# Patient Record
Sex: MALE | Race: WHITE | NOT HISPANIC OR LATINO | URBAN - METROPOLITAN AREA
[De-identification: names, ages, dates, MRNs, and addresses within clinical notes are randomized per-mention and may not be internally consistent; named-entity substitution may affect disease eponyms.]

---

## 2018-09-11 ENCOUNTER — EMERGENCY (EMERGENCY)
Facility: HOSPITAL | Age: 53
LOS: 1 days | Discharge: HOME | End: 2018-09-11
Attending: EMERGENCY MEDICINE
Payer: COMMERCIAL

## 2018-09-11 VITALS
RESPIRATION RATE: 17 BRPM | SYSTOLIC BLOOD PRESSURE: 160 MMHG | OXYGEN SATURATION: 98 % | TEMPERATURE: 99 F | DIASTOLIC BLOOD PRESSURE: 98 MMHG | HEART RATE: 79 BPM

## 2018-09-11 VITALS — HEIGHT: 73 IN | WEIGHT: 266.1 LBS

## 2018-09-11 PROCEDURE — 99283 EMERGENCY DEPT VISIT LOW MDM: CPT

## 2018-09-11 RX ORDER — METHOCARBAMOL 500 MG/1
2 TABLET, FILM COATED ORAL
Qty: 30 | Refills: 0 | OUTPATIENT
Start: 2018-09-11 | End: 2018-09-15

## 2018-09-11 RX ORDER — AMLODIPINE BESYLATE 2.5 MG/1
1 TABLET ORAL
Qty: 15 | Refills: 0 | OUTPATIENT
Start: 2018-09-11 | End: 2018-09-25
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
## 2018-09-11 NOTE — ED STATDOCS - DISPOSITION TYPE
[address] [address]  Phone: [phone]    July 17, 2018        Idalia Howe  [address] [place] [address]          To whom it may concern:       RE: Idalia Friedman has been working with current restrictive status since last time. She has worked with repetitive movement including bending back and neck/squatting legs/stretching shoulders and arms/frequent rotation of back and neck related with current position at work. 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 So, I recommend her that she should maintain the level of activity in carrying and level lifting as noted below. Then, we will reassess the functional status in 6 months around January 15th, 2019 or earlier as needed.       - May work up to 8 hours per day.  - Sitting - standard chair but stretching and positional changes as needed   - Standing and walking - stretching or resting 10 minutes every 30 minutes   - Carrying and level lifting - less than 10 lbs, occasionally(less than 50% of the day's shift). For example, at '[employer]' at her work, she cannot perform more than five trays per one session per day, and not more than three sessions per day    - Bending and lifting - may lift up to 15 lbs on occasional basis, up to 5 times per hour   - Pushing and pulling - less than 15 lbs at height between waist and chest and without bending forward       Please contact me for questions or concerns.        Sincerely,    Akhil Almodovar MD  
DISCHARGE
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
## 2018-09-11 NOTE — ED STATDOCS - CARE PLAN
Principal Discharge DX:	Acute strain of neck muscle, initial encounter  Secondary Diagnosis:	Acute back pain, unspecified back location, unspecified back pain laterality

## 2018-10-30 ENCOUNTER — INPATIENT (INPATIENT)
Facility: HOSPITAL | Age: 53
LOS: 2 days | Discharge: HOME | DRG: 603 | End: 2018-11-02
Attending: HOSPITALIST | Admitting: FAMILY MEDICINE
Payer: COMMERCIAL

## 2018-10-30 VITALS — HEIGHT: 73 IN | WEIGHT: 263.89 LBS

## 2018-10-30 DIAGNOSIS — L03.90 CELLULITIS, UNSPECIFIED: ICD-10-CM

## 2018-10-30 LAB
ALBUMIN SERPL ELPH-MCNC: 4.1 G/DL — SIGNIFICANT CHANGE UP (ref 3.3–5.2)
ALP SERPL-CCNC: 79 U/L — SIGNIFICANT CHANGE UP (ref 40–120)
ALT FLD-CCNC: 8 U/L — SIGNIFICANT CHANGE UP
ANION GAP SERPL CALC-SCNC: 14 MMOL/L — SIGNIFICANT CHANGE UP (ref 5–17)
APTT BLD: 30.7 SEC — SIGNIFICANT CHANGE UP (ref 27.5–36.3)
AST SERPL-CCNC: 8 U/L — SIGNIFICANT CHANGE UP
BASOPHILS # BLD AUTO: 0 K/UL — SIGNIFICANT CHANGE UP (ref 0–0.2)
BASOPHILS NFR BLD AUTO: 0.1 % — SIGNIFICANT CHANGE UP (ref 0–2)
BILIRUB SERPL-MCNC: 0.7 MG/DL — SIGNIFICANT CHANGE UP (ref 0.4–2)
BUN SERPL-MCNC: 18 MG/DL — SIGNIFICANT CHANGE UP (ref 8–20)
CALCIUM SERPL-MCNC: 9.7 MG/DL — SIGNIFICANT CHANGE UP (ref 8.6–10.2)
CHLORIDE SERPL-SCNC: 96 MMOL/L — LOW (ref 98–107)
CO2 SERPL-SCNC: 26 MMOL/L — SIGNIFICANT CHANGE UP (ref 22–29)
CREAT SERPL-MCNC: 0.96 MG/DL — SIGNIFICANT CHANGE UP (ref 0.5–1.3)
EOSINOPHIL # BLD AUTO: 0 K/UL — SIGNIFICANT CHANGE UP (ref 0–0.5)
EOSINOPHIL NFR BLD AUTO: 0.1 % — SIGNIFICANT CHANGE UP (ref 0–6)
GLUCOSE SERPL-MCNC: 104 MG/DL — SIGNIFICANT CHANGE UP (ref 70–115)
HCT VFR BLD CALC: 42.1 % — SIGNIFICANT CHANGE UP (ref 42–52)
HGB BLD-MCNC: 14.3 G/DL — SIGNIFICANT CHANGE UP (ref 14–18)
INR BLD: 1.16 RATIO — SIGNIFICANT CHANGE UP (ref 0.88–1.16)
LACTATE BLDV-MCNC: 1.2 MMOL/L — SIGNIFICANT CHANGE UP (ref 0.5–2)
LYMPHOCYTES # BLD AUTO: 1.4 K/UL — SIGNIFICANT CHANGE UP (ref 1–4.8)
LYMPHOCYTES # BLD AUTO: 8 % — LOW (ref 20–55)
MCHC RBC-ENTMCNC: 29.4 PG — SIGNIFICANT CHANGE UP (ref 27–31)
MCHC RBC-ENTMCNC: 34 G/DL — SIGNIFICANT CHANGE UP (ref 32–36)
MCV RBC AUTO: 86.6 FL — SIGNIFICANT CHANGE UP (ref 80–94)
MONOCYTES # BLD AUTO: 1 K/UL — HIGH (ref 0–0.8)
MONOCYTES NFR BLD AUTO: 5.9 % — SIGNIFICANT CHANGE UP (ref 3–10)
NEUTROPHILS # BLD AUTO: 15.2 K/UL — HIGH (ref 1.8–8)
NEUTROPHILS NFR BLD AUTO: 85.6 % — HIGH (ref 37–73)
PLATELET # BLD AUTO: 286 K/UL — SIGNIFICANT CHANGE UP (ref 150–400)
POTASSIUM SERPL-MCNC: 4.1 MMOL/L — SIGNIFICANT CHANGE UP (ref 3.5–5.3)
POTASSIUM SERPL-SCNC: 4.1 MMOL/L — SIGNIFICANT CHANGE UP (ref 3.5–5.3)
PROT SERPL-MCNC: 8.1 G/DL — SIGNIFICANT CHANGE UP (ref 6.6–8.7)
PROTHROM AB SERPL-ACNC: 13.4 SEC — HIGH (ref 10–12.9)
RBC # BLD: 4.86 M/UL — SIGNIFICANT CHANGE UP (ref 4.6–6.2)
RBC # FLD: 12.2 % — SIGNIFICANT CHANGE UP (ref 11–15.6)
SODIUM SERPL-SCNC: 136 MMOL/L — SIGNIFICANT CHANGE UP (ref 135–145)
WBC # BLD: 17.8 K/UL — HIGH (ref 4.8–10.8)
WBC # FLD AUTO: 17.8 K/UL — HIGH (ref 4.8–10.8)

## 2018-10-30 PROCEDURE — 73590 X-RAY EXAM OF LOWER LEG: CPT | Mod: 26,LT

## 2018-10-30 PROCEDURE — 93010 ELECTROCARDIOGRAM REPORT: CPT

## 2018-10-30 PROCEDURE — 99285 EMERGENCY DEPT VISIT HI MDM: CPT

## 2018-10-30 PROCEDURE — 93971 EXTREMITY STUDY: CPT | Mod: 26,LT

## 2018-10-30 RX ORDER — VANCOMYCIN HCL 1 G
1250 VIAL (EA) INTRAVENOUS EVERY 12 HOURS
Qty: 0 | Refills: 0 | Status: DISCONTINUED | OUTPATIENT
Start: 2018-10-30 | End: 2018-10-30

## 2018-10-30 RX ORDER — PANTOPRAZOLE SODIUM 20 MG/1
40 TABLET, DELAYED RELEASE ORAL
Qty: 0 | Refills: 0 | Status: DISCONTINUED | OUTPATIENT
Start: 2018-10-30 | End: 2018-10-31

## 2018-10-30 RX ORDER — ENOXAPARIN SODIUM 100 MG/ML
40 INJECTION SUBCUTANEOUS DAILY
Qty: 0 | Refills: 0 | Status: DISCONTINUED | OUTPATIENT
Start: 2018-10-30 | End: 2018-11-02

## 2018-10-30 RX ORDER — LACTOBACILLUS ACIDOPHILUS 100MM CELL
1 CAPSULE ORAL
Qty: 0 | Refills: 0 | Status: DISCONTINUED | OUTPATIENT
Start: 2018-10-30 | End: 2018-10-31

## 2018-10-30 RX ORDER — PIPERACILLIN AND TAZOBACTAM 4; .5 G/20ML; G/20ML
3.38 INJECTION, POWDER, LYOPHILIZED, FOR SOLUTION INTRAVENOUS EVERY 8 HOURS
Qty: 0 | Refills: 0 | Status: DISCONTINUED | OUTPATIENT
Start: 2018-10-30 | End: 2018-11-02

## 2018-10-30 RX ORDER — KETOROLAC TROMETHAMINE 30 MG/ML
30 SYRINGE (ML) INJECTION ONCE
Qty: 0 | Refills: 0 | Status: DISCONTINUED | OUTPATIENT
Start: 2018-10-30 | End: 2018-10-30

## 2018-10-30 RX ORDER — INFLUENZA VIRUS VACCINE 15; 15; 15; 15 UG/.5ML; UG/.5ML; UG/.5ML; UG/.5ML
0.5 SUSPENSION INTRAMUSCULAR ONCE
Qty: 0 | Refills: 0 | Status: COMPLETED | OUTPATIENT
Start: 2018-10-30 | End: 2018-10-30

## 2018-10-30 RX ORDER — VANCOMYCIN HCL 1 G
1000 VIAL (EA) INTRAVENOUS ONCE
Qty: 0 | Refills: 0 | Status: COMPLETED | OUTPATIENT
Start: 2018-10-30 | End: 2018-10-30

## 2018-10-30 RX ORDER — ACETAMINOPHEN 500 MG
650 TABLET ORAL EVERY 6 HOURS
Qty: 0 | Refills: 0 | Status: DISCONTINUED | OUTPATIENT
Start: 2018-10-30 | End: 2018-11-02

## 2018-10-30 RX ORDER — VANCOMYCIN HCL 1 G
1000 VIAL (EA) INTRAVENOUS EVERY 8 HOURS
Qty: 0 | Refills: 0 | Status: DISCONTINUED | OUTPATIENT
Start: 2018-10-31 | End: 2018-10-31

## 2018-10-30 RX ORDER — SODIUM CHLORIDE 9 MG/ML
1000 INJECTION INTRAMUSCULAR; INTRAVENOUS; SUBCUTANEOUS ONCE
Qty: 0 | Refills: 0 | Status: COMPLETED | OUTPATIENT
Start: 2018-10-30 | End: 2018-10-30

## 2018-10-30 RX ORDER — PIPERACILLIN AND TAZOBACTAM 4; .5 G/20ML; G/20ML
3.38 INJECTION, POWDER, LYOPHILIZED, FOR SOLUTION INTRAVENOUS ONCE
Qty: 0 | Refills: 0 | Status: COMPLETED | OUTPATIENT
Start: 2018-10-30 | End: 2018-10-30

## 2018-10-30 RX ADMIN — SODIUM CHLORIDE 1000 MILLILITER(S): 9 INJECTION INTRAMUSCULAR; INTRAVENOUS; SUBCUTANEOUS at 17:00

## 2018-10-30 RX ADMIN — Medication 30 MILLIGRAM(S): at 14:50

## 2018-10-30 RX ADMIN — Medication 30 MILLIGRAM(S): at 14:32

## 2018-10-30 RX ADMIN — SODIUM CHLORIDE 1000 MILLILITER(S): 9 INJECTION INTRAMUSCULAR; INTRAVENOUS; SUBCUTANEOUS at 14:32

## 2018-10-30 RX ADMIN — PIPERACILLIN AND TAZOBACTAM 3.38 GRAM(S): 4; .5 INJECTION, POWDER, LYOPHILIZED, FOR SOLUTION INTRAVENOUS at 17:00

## 2018-10-30 RX ADMIN — Medication 1000 MILLIGRAM(S): at 19:06

## 2018-10-30 RX ADMIN — Medication 250 MILLIGRAM(S): at 17:07

## 2018-10-30 RX ADMIN — PIPERACILLIN AND TAZOBACTAM 200 GRAM(S): 4; .5 INJECTION, POWDER, LYOPHILIZED, FOR SOLUTION INTRAVENOUS at 14:33

## 2018-10-30 RX ADMIN — PIPERACILLIN AND TAZOBACTAM 25 GRAM(S): 4; .5 INJECTION, POWDER, LYOPHILIZED, FOR SOLUTION INTRAVENOUS at 21:47

## 2018-10-30 NOTE — H&P ADULT - NSHPPHYSICALEXAM_GEN_ALL_CORE
General: Well developed. well nourished. not in distress  HEENT: AT, NC. PERRL. intact EOM. no throat erythema or exudate.   Neck: supple. no JVD.  Chest: CTA bilaterally  Heart: normal S1,S2. RRR. no heart murmur. no edema.   Abdomen: soft. non-tender. non-distended. + BS.   Rectal : deferred by pt.   Ext: ROM of all ext. intact.   Vascular : 2 + DP b/L.   Neuro: AAO x3. no focal weakness. no speech disorder. CN II to XII intact.   Skin: + erythema, + warmth noted over LLE extending from ankle to slightly below knee area. 2 small satellite spots noted over left medial thigh area. no open wound. no lesions noted between B/L toes. 2 small dry, mildly erythematous spots with shiny, silver scales noted over ant. aspect of  both knees.

## 2018-10-30 NOTE — ED PROVIDER NOTE - OBJECTIVE STATEMENT
The patient is a 53 year old male presents with L leg redness and swelling and pain and getting worse over few days despite on antibiotic (doxycycline). No CP, No SOB, No ABD Pain

## 2018-10-30 NOTE — H&P ADULT - PROBLEM SELECTOR PLAN 1
pt. will be on vanco, zosyn, follow blood cx. LLE elevation. no h/o htn, close monitoring of bp, low salt diet.   lovenox for dvt prophylaxis.

## 2018-10-30 NOTE — H&P ADULT - HISTORY OF PRESENT ILLNESS
54 y/o male came to ER for 3 days h/o LLE redness, warmth and some swelling, subjective fever, no documented fever. pt. thinks that he his LLE below knee was itchy few days ago and was scratching and likely got skin break from that . Pt thinks that is likely the source of his LLE swelling, redness. pt. was seen by pcp and was given doxycycline, used few doses but did not get better and sympt. increased. Pt. came to ER for further evaluation. no cough, no cp. no abd. pain. no n/v/d. pt. has h/o psoriasis , currently has only 2 small spots on the knees, uses some lotion but does not know that name, wife will bring that in am. As per pt. he is not using any meds for his Htn as his pcp wants him to loose wt. and if bp still high then will consider meds.

## 2018-10-30 NOTE — ED ADULT TRIAGE NOTE - CHIEF COMPLAINT QUOTE
Pt presents to ED c/o L lower extremity swelling / redness since Saturday. Pt was seen by DR. ward on Monday and was prescribed Doxycycline + fever on/off since Saturday. Pt was called today to come to ED due to high white blood count 19.0

## 2018-10-30 NOTE — ED ADULT NURSE NOTE - OBJECTIVE STATEMENT
53 year old male comes to ED complaining of left leg cellulitis. patient states he went to his doctor on Monday and was prescribed doxy. extremity red, swollen, hot to touch. 53 year old male comes to ED complaining of left leg cellulitis. patient states he went to his doctor on Monday and was prescribed doxy. patient has been having fevers on and off since sat. pt. was called today to come to ER for high WBC. pt. lower extremity red, swollen, hot to touch. 53 year old male comes to ED complaining of left leg cellulitis. patient states he went to his doctor on Monday and was prescribed doxy. patient has been having fevers on and off since sat. pt. was called today to come to ER for high WBC. lower extremity redness, noted, swollen, warm to touch.

## 2018-10-30 NOTE — ED STATDOCS - OBJECTIVE STATEMENT
Telemedicine assessment was conducted (using real time 2 way audio-video technology) by Dr. Zaid Stafford located at [address] [place] [address]  ++++++++++++++++++++++++  Pertinent patient history and initial plan: 54 y/o M pt with hx of GERD presents to ED c/o ROSA lower extremity redness and swelling and intermittent fever of 102 F that began 3 days ago. He also notes HA, dizziness, nausea and vomiting. Pt was seen by PMD yesterday and was given Doxycycline BID. He was called today for elevated WBC of 19.0 and was told to come to the ED. NKDA Denies hx of DVT.   PMD: Dr. Jose Cruz Castaneda Telemedicine assessment was conducted (using real time 2 way audio-video technology) by Dr. Zaid Stafford located at [address] [place] [address]  ++++++++++++++++++++++++  Pertinent patient history and initial plan: 54 y/o M pt with hx of GERD presents to ED c/o left lower extremity redness and swelling and intermittent fever of 102 F that began 3 days ago. He also notes HA, dizziness, nausea and vomiting. Pt was seen by PMD yesterday and was given Doxycycline BID. He was called today for elevated WBC of 19.0 and was told to come to the ED. NKDA Denies hx of DVT.   PMD: Dr. Jose Cruz Castaneda    +erythema and swelling to LLE, likely cellulitis  labs, abx, sono    Patient seen by me in intake for initial assessment and ordering. Physician on site to follow results and further evaluate and treat patient.

## 2018-10-30 NOTE — ED PROVIDER NOTE - CHPI ED SYMPTOMS NEG
no decreased eating/drinking/no headache/no abdominal pain/no shortness of breath/no diarrhea/no vomiting/no cough

## 2018-10-31 DIAGNOSIS — L40.9 PSORIASIS, UNSPECIFIED: ICD-10-CM

## 2018-10-31 DIAGNOSIS — L03.116 CELLULITIS OF LEFT LOWER LIMB: ICD-10-CM

## 2018-10-31 LAB
ANION GAP SERPL CALC-SCNC: 13 MMOL/L — SIGNIFICANT CHANGE UP (ref 5–17)
APPEARANCE UR: CLEAR — SIGNIFICANT CHANGE UP
BASOPHILS # BLD AUTO: 0 K/UL — SIGNIFICANT CHANGE UP (ref 0–0.2)
BASOPHILS NFR BLD AUTO: 0.2 % — SIGNIFICANT CHANGE UP (ref 0–2)
BILIRUB UR-MCNC: NEGATIVE — SIGNIFICANT CHANGE UP
BUN SERPL-MCNC: 18 MG/DL — SIGNIFICANT CHANGE UP (ref 8–20)
CALCIUM SERPL-MCNC: 9 MG/DL — SIGNIFICANT CHANGE UP (ref 8.6–10.2)
CHLORIDE SERPL-SCNC: 100 MMOL/L — SIGNIFICANT CHANGE UP (ref 98–107)
CO2 SERPL-SCNC: 23 MMOL/L — SIGNIFICANT CHANGE UP (ref 22–29)
COLOR SPEC: YELLOW — SIGNIFICANT CHANGE UP
CREAT SERPL-MCNC: 0.93 MG/DL — SIGNIFICANT CHANGE UP (ref 0.5–1.3)
DIFF PNL FLD: ABNORMAL
EOSINOPHIL # BLD AUTO: 0.1 K/UL — SIGNIFICANT CHANGE UP (ref 0–0.5)
EOSINOPHIL NFR BLD AUTO: 0.7 % — SIGNIFICANT CHANGE UP (ref 0–6)
GLUCOSE SERPL-MCNC: 126 MG/DL — HIGH (ref 70–115)
GLUCOSE UR QL: NEGATIVE MG/DL — SIGNIFICANT CHANGE UP
HCT VFR BLD CALC: 37.3 % — LOW (ref 42–52)
HGB BLD-MCNC: 13 G/DL — LOW (ref 14–18)
KETONES UR-MCNC: NEGATIVE — SIGNIFICANT CHANGE UP
LEUKOCYTE ESTERASE UR-ACNC: NEGATIVE — SIGNIFICANT CHANGE UP
LYMPHOCYTES # BLD AUTO: 1 K/UL — SIGNIFICANT CHANGE UP (ref 1–4.8)
LYMPHOCYTES # BLD AUTO: 7.3 % — LOW (ref 20–55)
MCHC RBC-ENTMCNC: 30.2 PG — SIGNIFICANT CHANGE UP (ref 27–31)
MCHC RBC-ENTMCNC: 34.9 G/DL — SIGNIFICANT CHANGE UP (ref 32–36)
MCV RBC AUTO: 86.7 FL — SIGNIFICANT CHANGE UP (ref 80–94)
MONOCYTES # BLD AUTO: 0.8 K/UL — SIGNIFICANT CHANGE UP (ref 0–0.8)
MONOCYTES NFR BLD AUTO: 5.9 % — SIGNIFICANT CHANGE UP (ref 3–10)
NEUTROPHILS # BLD AUTO: 11.6 K/UL — HIGH (ref 1.8–8)
NEUTROPHILS NFR BLD AUTO: 85.5 % — HIGH (ref 37–73)
NITRITE UR-MCNC: NEGATIVE — SIGNIFICANT CHANGE UP
PH UR: 6 — SIGNIFICANT CHANGE UP (ref 5–8)
PLATELET # BLD AUTO: 287 K/UL — SIGNIFICANT CHANGE UP (ref 150–400)
POTASSIUM SERPL-MCNC: 4 MMOL/L — SIGNIFICANT CHANGE UP (ref 3.5–5.3)
POTASSIUM SERPL-SCNC: 4 MMOL/L — SIGNIFICANT CHANGE UP (ref 3.5–5.3)
PROT UR-MCNC: 30 MG/DL
RBC # BLD: 4.3 M/UL — LOW (ref 4.6–6.2)
RBC # FLD: 12.1 % — SIGNIFICANT CHANGE UP (ref 11–15.6)
SODIUM SERPL-SCNC: 136 MMOL/L — SIGNIFICANT CHANGE UP (ref 135–145)
SP GR SPEC: 1.01 — SIGNIFICANT CHANGE UP (ref 1.01–1.02)
UROBILINOGEN FLD QL: NEGATIVE MG/DL — SIGNIFICANT CHANGE UP
WBC # BLD: 13.6 K/UL — HIGH (ref 4.8–10.8)
WBC # FLD AUTO: 13.6 K/UL — HIGH (ref 4.8–10.8)

## 2018-10-31 PROCEDURE — 99233 SBSQ HOSP IP/OBS HIGH 50: CPT

## 2018-10-31 RX ORDER — PANTOPRAZOLE SODIUM 20 MG/1
40 TABLET, DELAYED RELEASE ORAL
Qty: 0 | Refills: 0 | Status: DISCONTINUED | OUTPATIENT
Start: 2018-10-31 | End: 2018-11-02

## 2018-10-31 RX ORDER — SACCHAROMYCES BOULARDII 250 MG
250 POWDER IN PACKET (EA) ORAL
Qty: 0 | Refills: 0 | Status: DISCONTINUED | OUTPATIENT
Start: 2018-10-31 | End: 2018-11-02

## 2018-10-31 RX ORDER — OMEPRAZOLE 10 MG/1
1 CAPSULE, DELAYED RELEASE ORAL
Qty: 0 | Refills: 0 | COMMUNITY

## 2018-10-31 RX ADMIN — ENOXAPARIN SODIUM 40 MILLIGRAM(S): 100 INJECTION SUBCUTANEOUS at 13:08

## 2018-10-31 RX ADMIN — Medication 650 MILLIGRAM(S): at 13:08

## 2018-10-31 RX ADMIN — PIPERACILLIN AND TAZOBACTAM 25 GRAM(S): 4; .5 INJECTION, POWDER, LYOPHILIZED, FOR SOLUTION INTRAVENOUS at 06:39

## 2018-10-31 RX ADMIN — Medication 250 MILLIGRAM(S): at 04:55

## 2018-10-31 RX ADMIN — PIPERACILLIN AND TAZOBACTAM 25 GRAM(S): 4; .5 INJECTION, POWDER, LYOPHILIZED, FOR SOLUTION INTRAVENOUS at 13:09

## 2018-10-31 RX ADMIN — Medication 650 MILLIGRAM(S): at 23:40

## 2018-10-31 RX ADMIN — Medication 650 MILLIGRAM(S): at 14:00

## 2018-10-31 RX ADMIN — PANTOPRAZOLE SODIUM 40 MILLIGRAM(S): 20 TABLET, DELAYED RELEASE ORAL at 06:39

## 2018-10-31 RX ADMIN — Medication 250 MILLIGRAM(S): at 17:56

## 2018-10-31 RX ADMIN — PIPERACILLIN AND TAZOBACTAM 25 GRAM(S): 4; .5 INJECTION, POWDER, LYOPHILIZED, FOR SOLUTION INTRAVENOUS at 22:23

## 2018-10-31 NOTE — PROGRESS NOTE ADULT - ASSESSMENT
Assessment and Plan:    Problem/Plan - 1:  ·  Problem: Cellulitis of left lower extremity.  Plan: Received IV Vanco in ER, dc'd, continue IV  zosyn, follow blood cx. LLE elevation. H/O ?prediabetes, will check HGA1c. WBC trending down, afebrile.      Problem/Plan - 2:  ·  Problem: Psoriasis.  Plan: h/o psoriasis, will continue his home lotion    Problem/Plan-3:  Problem: Obesity. Plan: Nutrition consult    DVT ppx: Lovenox Pt is 52 y/o male with PMHx of GERD, presented to ED with LLE swelling, redness and pain- cellulitis was on PO outpatient Abx but did not help. Admitted to medicine LLE cellulitis.     Cellulitis of left lower extremity:  - Afebrile, wbc down trend.   - D/c Vanco, continue IV zosyn for now.   - Follow blood cx and deescalate abx as per cx.   - LLE elevation.     > ? H/O Prediabetes- will check HGA1c.   >H/o Psoriasis- Moisturizer   >Obesity- Nutrition consult  >DVT ppx: Lovenox

## 2018-10-31 NOTE — PROGRESS NOTE ADULT - SUBJECTIVE AND OBJECTIVE BOX
CC: left leg redness     INTERVAL HPI/OVERNIGHT EVENTS: Patient seenand examined. Reports improvement in LLE redness. Denies chest pain, SOB, dizziness, nausea, vomiting, fever, chills. Ambulating in room. Denies pain, Endorses intentional weight loss x 40 lbs due to borderline diabetes/HTN, not currently on medication.     Vital Signs Last 24 Hrs  T(C): 36.8 (31 Oct 2018 08:41), Max: 37.2 (30 Oct 2018 23:48)  T(F): 98.3 (31 Oct 2018 08:41), Max: 98.9 (30 Oct 2018 23:48)  HR: 94 (31 Oct 2018 08:41) (86 - 94)  BP: 129/80 (31 Oct 2018 08:41) (124/74 - 148/87)  BP(mean): --  RR: 18 (31 Oct 2018 08:41) (18 - 19)  SpO2: 96% (31 Oct 2018 08:41) (96% - 98%)    Physical Exam: General: Well developed. well nourished. not in distress  	HEENT: AT, NC. PERRL. intact EOM. no throat erythema or exudate.   	Neck: supple. no JVD.  	Chest: CTA bilaterally  	Heart: normal S1,S2. RRR. no heart murmur. no edema.   	Abdomen: soft. non-tender. non-distended. + BS.   	Rectal : deferred by pt.   	Ext: ROM of all ext. intact. LLE redness, improved.  	Vascular : 2 + DP b/L.   	Neuro: AAO x3. no focal weakness. no speech disorder. CN II to XII intact.       I&O's Detail                                13.0   13.6  )-----------( 287      ( 31 Oct 2018 08:07 )             37.3     31 Oct 2018 08:07    136    |  100    |  18.0   ----------------------------<  126    4.0     |  23.0   |  0.93     Ca    9.0        31 Oct 2018 08:07    TPro  8.1    /  Alb  4.1    /  TBili  0.7    /  DBili  x      /  AST  8      /  ALT  8      /  AlkPhos  79     30 Oct 2018 14:55    PT/INR - ( 30 Oct 2018 14:55 )   PT: 13.4 sec;   INR: 1.16 ratio         PTT - ( 30 Oct 2018 14:55 )  PTT:30.7 sec  CAPILLARY BLOOD GLUCOSE        LIVER FUNCTIONS - ( 30 Oct 2018 14:55 )  Alb: 4.1 g/dL / Pro: 8.1 g/dL / ALK PHOS: 79 U/L / ALT: 8 U/L / AST: 8 U/L / GGT: x           Urinalysis Basic - ( 31 Oct 2018 02:13 )    Color: Yellow / Appearance: Clear / S.010 / pH: x  Gluc: x / Ketone: Negative  / Bili: Negative / Urobili: Negative mg/dL   Blood: x / Protein: 30 mg/dL / Nitrite: Negative   Leuk Esterase: Negative / RBC: 0-2 /HPF / WBC Negative   Sq Epi: x / Non Sq Epi: Occasional / Bacteria: x        MEDICATIONS  (STANDING):  enoxaparin Injectable 40 milliGRAM(s) SubCutaneous daily  influenza   Vaccine 0.5 milliLiter(s) IntraMuscular once  piperacillin/tazobactam IVPB. 3.375 Gram(s) IV Intermittent every 8 hours  saccharomyces boulardii 250 milliGRAM(s) Oral two times a day    MEDICATIONS  (PRN):  acetaminophen   Tablet .. 650 milliGRAM(s) Oral every 6 hours PRN Temp greater or equal to 38C (100.4F), Moderate Pain (4 - 6)      RADIOLOGY & ADDITIONAL TESTS:   EXAM:  US DPLX LWR EXT VEINS LTD                           PROCEDURE DATE:  10/30/2018          INTERPRETATION:  Ultrasound of the lower extremity deep venous system      CLINICAL INFORMATION: LEFT lower extremity swelling and redness.,   evaluate for deep venous thrombosis.    TECHNIQUE:   Duplex ultrasonography with color and spectral doppler of   the left lower extremity deep venous system was performed.      FINDINGS:    No previous examinations are  available for review.    The left lower extremity deep venous system demonstrated no abnormality.    The veins were patent with intact Doppler flow.  The flow varied with   respiration and augmented with distal calf compression.  The veins were   directly compressible by the ultrasound transducer.       The veins evaluated included the common femoral vein, the inflow of the   greater saphenous vein, the inflow of the deep femoral vein, the   superficial femoral vein, the popliteal vein and posterior tibial veins.        IMPRESSION:Unremarkable ultrasound of the left lower extremity deep   venous system.                      HAYDEN GRACE M.D., ATTENDING RADIOLOGIST  This document has been electronically signed. Oct 30 2018  4:12PM CC: left leg redness     INTERVAL HPI/OVERNIGHT EVENTS: Patient seenand examined. Reports improvement in LLE redness. Denies chest pain, SOB, dizziness, nausea, vomiting, fever, chills. Ambulating in room. Denies pain, Endorses intentional weight loss x 40 lbs due to borderline diabetes/HTN, not currently on medication.     Vital Signs Last 24 Hrs  T(C): 36.8 (31 Oct 2018 08:41), Max: 37.2 (30 Oct 2018 23:48)  T(F): 98.3 (31 Oct 2018 08:41), Max: 98.9 (30 Oct 2018 23:48)  HR: 94 (31 Oct 2018 08:41) (86 - 94)  BP: 129/80 (31 Oct 2018 08:41) (124/74 - 148/87)  BP(mean): --  RR: 18 (31 Oct 2018 08:41) (18 - 19)  SpO2: 96% (31 Oct 2018 08:41) (96% - 98%)    Physical Exam: General: Well developed. well nourished. not in distress  	HEENT: AT, NC. PERRL. intact EOM. no throat erythema or exudate.   	Neck: supple. no JVD.  	Chest: CTA bilaterally  	Heart: normal S1,S2. RRR. no heart murmur. no edema.   	Abdomen: soft. non-tender. non-distended. + BS.   	Rectal : deferred by pt.   	Ext: ROM of all ext. intact. LLE redness/ swelling, improving.  	Vascular : 2 + DP b/L.   	Neuro: AAO x3. no focal weakness. no speech disorder. CN II to XII intact.       I&O's Detail                                13.0   13.6  )-----------( 287      ( 31 Oct 2018 08:07 )             37.3     31 Oct 2018 08:07    136    |  100    |  18.0   ----------------------------<  126    4.0     |  23.0   |  0.93     Ca    9.0        31 Oct 2018 08:07    TPro  8.1    /  Alb  4.1    /  TBili  0.7    /  DBili  x      /  AST  8      /  ALT  8      /  AlkPhos  79     30 Oct 2018 14:55    PT/INR - ( 30 Oct 2018 14:55 )   PT: 13.4 sec;   INR: 1.16 ratio         PTT - ( 30 Oct 2018 14:55 )  PTT:30.7 sec  CAPILLARY BLOOD GLUCOSE        LIVER FUNCTIONS - ( 30 Oct 2018 14:55 )  Alb: 4.1 g/dL / Pro: 8.1 g/dL / ALK PHOS: 79 U/L / ALT: 8 U/L / AST: 8 U/L / GGT: x           Urinalysis Basic - ( 31 Oct 2018 02:13 )    Color: Yellow / Appearance: Clear / S.010 / pH: x  Gluc: x / Ketone: Negative  / Bili: Negative / Urobili: Negative mg/dL   Blood: x / Protein: 30 mg/dL / Nitrite: Negative   Leuk Esterase: Negative / RBC: 0-2 /HPF / WBC Negative   Sq Epi: x / Non Sq Epi: Occasional / Bacteria: x        MEDICATIONS  (STANDING):  enoxaparin Injectable 40 milliGRAM(s) SubCutaneous daily  influenza   Vaccine 0.5 milliLiter(s) IntraMuscular once  piperacillin/tazobactam IVPB. 3.375 Gram(s) IV Intermittent every 8 hours  saccharomyces boulardii 250 milliGRAM(s) Oral two times a day    MEDICATIONS  (PRN):  acetaminophen   Tablet .. 650 milliGRAM(s) Oral every 6 hours PRN Temp greater or equal to 38C (100.4F), Moderate Pain (4 - 6)      RADIOLOGY & ADDITIONAL TESTS:   EXAM:  US DPLX LWR EXT VEINS LTD                           PROCEDURE DATE:  10/30/2018          INTERPRETATION:  Ultrasound of the lower extremity deep venous system      CLINICAL INFORMATION: LEFT lower extremity swelling and redness.,   evaluate for deep venous thrombosis.    TECHNIQUE:   Duplex ultrasonography with color and spectral doppler of   the left lower extremity deep venous system was performed.      FINDINGS:    No previous examinations are  available for review.    The left lower extremity deep venous system demonstrated no abnormality.    The veins were patent with intact Doppler flow.  The flow varied with   respiration and augmented with distal calf compression.  The veins were   directly compressible by the ultrasound transducer.       The veins evaluated included the common femoral vein, the inflow of the   greater saphenous vein, the inflow of the deep femoral vein, the   superficial femoral vein, the popliteal vein and posterior tibial veins.        IMPRESSION:Unremarkable ultrasound of the left lower extremity deep   venous system.                      HAYDEN GRACE M.D., ATTENDING RADIOLOGIST  This document has been electronically signed. Oct 30 2018  4:12PM

## 2018-11-01 LAB
ANION GAP SERPL CALC-SCNC: 11 MMOL/L — SIGNIFICANT CHANGE UP (ref 5–17)
BUN SERPL-MCNC: 17 MG/DL — SIGNIFICANT CHANGE UP (ref 8–20)
CALCIUM SERPL-MCNC: 8.8 MG/DL — SIGNIFICANT CHANGE UP (ref 8.6–10.2)
CHLORIDE SERPL-SCNC: 102 MMOL/L — SIGNIFICANT CHANGE UP (ref 98–107)
CO2 SERPL-SCNC: 25 MMOL/L — SIGNIFICANT CHANGE UP (ref 22–29)
CREAT SERPL-MCNC: 0.95 MG/DL — SIGNIFICANT CHANGE UP (ref 0.5–1.3)
CULTURE RESULTS: NO GROWTH — SIGNIFICANT CHANGE UP
GLUCOSE SERPL-MCNC: 121 MG/DL — HIGH (ref 70–115)
HBA1C BLD-MCNC: 5.3 % — SIGNIFICANT CHANGE UP (ref 4–5.6)
HCT VFR BLD CALC: 35.2 % — LOW (ref 42–52)
HGB BLD-MCNC: 12.2 G/DL — LOW (ref 14–18)
MCHC RBC-ENTMCNC: 30.7 PG — SIGNIFICANT CHANGE UP (ref 27–31)
MCHC RBC-ENTMCNC: 34.7 G/DL — SIGNIFICANT CHANGE UP (ref 32–36)
MCV RBC AUTO: 88.4 FL — SIGNIFICANT CHANGE UP (ref 80–94)
PLATELET # BLD AUTO: 302 K/UL — SIGNIFICANT CHANGE UP (ref 150–400)
POTASSIUM SERPL-MCNC: 3.8 MMOL/L — SIGNIFICANT CHANGE UP (ref 3.5–5.3)
POTASSIUM SERPL-SCNC: 3.8 MMOL/L — SIGNIFICANT CHANGE UP (ref 3.5–5.3)
RBC # BLD: 3.98 M/UL — LOW (ref 4.6–6.2)
RBC # FLD: 12.4 % — SIGNIFICANT CHANGE UP (ref 11–15.6)
SODIUM SERPL-SCNC: 138 MMOL/L — SIGNIFICANT CHANGE UP (ref 135–145)
SPECIMEN SOURCE: SIGNIFICANT CHANGE UP
WBC # BLD: 10.9 K/UL — HIGH (ref 4.8–10.8)
WBC # FLD AUTO: 10.9 K/UL — HIGH (ref 4.8–10.8)

## 2018-11-01 PROCEDURE — 99232 SBSQ HOSP IP/OBS MODERATE 35: CPT

## 2018-11-01 RX ADMIN — PIPERACILLIN AND TAZOBACTAM 25 GRAM(S): 4; .5 INJECTION, POWDER, LYOPHILIZED, FOR SOLUTION INTRAVENOUS at 22:16

## 2018-11-01 RX ADMIN — Medication 650 MILLIGRAM(S): at 19:10

## 2018-11-01 RX ADMIN — Medication 650 MILLIGRAM(S): at 00:20

## 2018-11-01 RX ADMIN — PIPERACILLIN AND TAZOBACTAM 25 GRAM(S): 4; .5 INJECTION, POWDER, LYOPHILIZED, FOR SOLUTION INTRAVENOUS at 05:35

## 2018-11-01 RX ADMIN — ENOXAPARIN SODIUM 40 MILLIGRAM(S): 100 INJECTION SUBCUTANEOUS at 14:04

## 2018-11-01 RX ADMIN — PANTOPRAZOLE SODIUM 40 MILLIGRAM(S): 20 TABLET, DELAYED RELEASE ORAL at 05:35

## 2018-11-01 RX ADMIN — PIPERACILLIN AND TAZOBACTAM 25 GRAM(S): 4; .5 INJECTION, POWDER, LYOPHILIZED, FOR SOLUTION INTRAVENOUS at 14:12

## 2018-11-01 RX ADMIN — Medication 650 MILLIGRAM(S): at 18:13

## 2018-11-01 RX ADMIN — Medication 250 MILLIGRAM(S): at 18:13

## 2018-11-01 RX ADMIN — Medication 250 MILLIGRAM(S): at 05:35

## 2018-11-01 NOTE — PROGRESS NOTE ADULT - ATTENDING COMMENTS
I have seen and examined the patient with NP and agree with the above assessment and plan.  On Exam- pt AOX3, LCTA, heart sounds regular, LLE erythema/swelling much improved.   LLE Cellulitis- C/x zosyn pending cx, deescalate abx tomorrow if cx remains negative.
I have seen and examined the patient with NP and agree with the above assessment and plan.  On Exam- pt AOX3, LCTA, heart sounds regular, LLE erythema/swelling+  LLE Cellulitis- C/x zosyn, d/c vanco, f/u cx.

## 2018-11-01 NOTE — PROGRESS NOTE ADULT - ASSESSMENT
Pt is 52 y/o male with PMHx of GERD, presented to ED with LLE swelling, redness and pain- cellulitis was on PO outpatient Abx but did not help. Admitted to medicine LLE cellulitis.     Cellulitis of left lower extremity:  - Afebrile, wbc continues to down trend.   -  continue IV zosyn for now.   - Follow blood cx and deescalate abx as per cx.   - LLE elevation.     > ? H/O Prediabetes- Patient has had 40 lb intentional weight loss now with HGA1 c 5.3.   >H/o Psoriasis- Moisturizer   >Obesity- Nutrition input appreciated  >DVT ppx: Lovenox Pt is 52 y/o male with PMHx of GERD, presented to ED with LLE swelling, redness and pain- cellulitis was on PO outpatient Abx but did not help. Admitted to medicine LLE cellulitis. Started on IV abx, blood cx pending.     Cellulitis of left lower extremity:  - Afebrile, wbc continues to down trend.   - continue IV zosyn for now pending cx   - Follow blood cx and deescalate abx as per cx.   - LLE elevation.     >? H/O Prediabetes- A1C 5.3, no PreDM.    >H/o Psoriasis- Moisturizer   >Obesity- Nutrition input appreciated  >DVT ppx: Lovenox

## 2018-11-01 NOTE — PROGRESS NOTE ADULT - SUBJECTIVE AND OBJECTIVE BOX
CC: left leg redness    INTERVAL HPI/OVERNIGHT EVENTS: Patient seen and examined. Ambulating in room without difficulty. LLE redness and pain improved. Denies chest pain, SOB, dizziness, nausea, vomiting, fever, chills.     Vital Signs Last 24 Hrs  T(C): 36.8 (2018 08:15), Max: 37 (31 Oct 2018 15:24)  T(F): 98.3 (2018 08:15), Max: 98.6 (31 Oct 2018 15:24)  HR: 93 (2018 08:15) (86 - 93)  BP: 140/93 (2018 08:15) (121/78 - 144/79)  BP(mean): --  RR: 18 (2018 08:15) (18 - 18)  SpO2: 97% (2018 08:15) (97% - 98%)    Physical Exam:              General: Well developed. well nourished. not in distress  	HEENT: AT, NC. PERRL. intact EOM. no throat erythema or exudate.   	Neck: supple. no JVD.  	Chest: CTA bilaterally  	Heart: normal S1,S2. RRR. no heart murmur. no edema.   	Abdomen: soft. non-tender. non-distended. + BS.   	Rectal : deferred by pt.   	Ext: ROM of all ext. intact. LLE redness/ swelling, improving.  	Vascular : 2 + DP b/L.   	Neuro: AAO x3. no focal weakness. no speech disorder. CN II to XII intact.       I&O's Detail    31 Oct 2018 07:01  -  2018 07:00  --------------------------------------------------------  IN:    Oral Fluid: 480 mL  Total IN: 480 mL    OUT:    Voided: 700 mL  Total OUT: 700 mL    Total NET: -220 mL                                    12.2   10.9  )-----------( 302      ( 2018 07:56 )             35.2     2018 07:35    138    |  102    |  17.0   ----------------------------<  121    3.8     |  25.0   |  0.95     Ca    8.8        2018 07:35    TPro  8.1    /  Alb  4.1    /  TBili  0.7    /  DBili  x      /  AST  8      /  ALT  8      /  AlkPhos  79     30 Oct 2018 14:55    PT/INR - ( 30 Oct 2018 14:55 )   PT: 13.4 sec;   INR: 1.16 ratio         PTT - ( 30 Oct 2018 14:55 )  PTT:30.7 sec  CAPILLARY BLOOD GLUCOSE        LIVER FUNCTIONS - ( 30 Oct 2018 14:55 )  Alb: 4.1 g/dL / Pro: 8.1 g/dL / ALK PHOS: 79 U/L / ALT: 8 U/L / AST: 8 U/L / GGT: x           Urinalysis Basic - ( 31 Oct 2018 02:13 )    Color: Yellow / Appearance: Clear / S.010 / pH: x  Gluc: x / Ketone: Negative  / Bili: Negative / Urobili: Negative mg/dL   Blood: x / Protein: 30 mg/dL / Nitrite: Negative   Leuk Esterase: Negative / RBC: 0-2 /HPF / WBC Negative   Sq Epi: x / Non Sq Epi: Occasional / Bacteria: x      Hemoglobin A1C, Whole Blood: 5.3 % (18 @ 07:35)    MEDICATIONS  (STANDING):  enoxaparin Injectable 40 milliGRAM(s) SubCutaneous daily  influenza   Vaccine 0.5 milliLiter(s) IntraMuscular once  pantoprazole    Tablet 40 milliGRAM(s) Oral before breakfast  piperacillin/tazobactam IVPB. 3.375 Gram(s) IV Intermittent every 8 hours  saccharomyces boulardii 250 milliGRAM(s) Oral two times a day    MEDICATIONS  (PRN):  acetaminophen   Tablet .. 650 milliGRAM(s) Oral every 6 hours PRN Temp greater or equal to 38C (100.4F), Moderate Pain (4 - 6)      RADIOLOGY & ADDITIONAL TESTS:

## 2018-11-02 VITALS
TEMPERATURE: 98 F | DIASTOLIC BLOOD PRESSURE: 85 MMHG | OXYGEN SATURATION: 98 % | HEART RATE: 80 BPM | SYSTOLIC BLOOD PRESSURE: 130 MMHG | RESPIRATION RATE: 18 BRPM

## 2018-11-02 LAB
ANION GAP SERPL CALC-SCNC: 15 MMOL/L — SIGNIFICANT CHANGE UP (ref 5–17)
BUN SERPL-MCNC: 17 MG/DL — SIGNIFICANT CHANGE UP (ref 8–20)
CALCIUM SERPL-MCNC: 9.4 MG/DL — SIGNIFICANT CHANGE UP (ref 8.6–10.2)
CHLORIDE SERPL-SCNC: 101 MMOL/L — SIGNIFICANT CHANGE UP (ref 98–107)
CO2 SERPL-SCNC: 23 MMOL/L — SIGNIFICANT CHANGE UP (ref 22–29)
CREAT SERPL-MCNC: 0.94 MG/DL — SIGNIFICANT CHANGE UP (ref 0.5–1.3)
GLUCOSE SERPL-MCNC: 111 MG/DL — SIGNIFICANT CHANGE UP (ref 70–115)
HCT VFR BLD CALC: 38.6 % — LOW (ref 42–52)
HGB BLD-MCNC: 13.4 G/DL — LOW (ref 14–18)
MAGNESIUM SERPL-MCNC: 2.4 MG/DL — SIGNIFICANT CHANGE UP (ref 1.6–2.6)
MCHC RBC-ENTMCNC: 31.1 PG — HIGH (ref 27–31)
MCHC RBC-ENTMCNC: 34.7 G/DL — SIGNIFICANT CHANGE UP (ref 32–36)
MCV RBC AUTO: 89.6 FL — SIGNIFICANT CHANGE UP (ref 80–94)
PHOSPHATE SERPL-MCNC: 3.5 MG/DL — SIGNIFICANT CHANGE UP (ref 2.4–4.7)
PLATELET # BLD AUTO: 374 K/UL — SIGNIFICANT CHANGE UP (ref 150–400)
POTASSIUM SERPL-MCNC: 4.1 MMOL/L — SIGNIFICANT CHANGE UP (ref 3.5–5.3)
POTASSIUM SERPL-SCNC: 4.1 MMOL/L — SIGNIFICANT CHANGE UP (ref 3.5–5.3)
RBC # BLD: 4.31 M/UL — LOW (ref 4.6–6.2)
RBC # FLD: 12.4 % — SIGNIFICANT CHANGE UP (ref 11–15.6)
SODIUM SERPL-SCNC: 139 MMOL/L — SIGNIFICANT CHANGE UP (ref 135–145)
WBC # BLD: 11.9 K/UL — HIGH (ref 4.8–10.8)
WBC # FLD AUTO: 11.9 K/UL — HIGH (ref 4.8–10.8)

## 2018-11-02 PROCEDURE — 83735 ASSAY OF MAGNESIUM: CPT

## 2018-11-02 PROCEDURE — 73590 X-RAY EXAM OF LOWER LEG: CPT

## 2018-11-02 PROCEDURE — 96365 THER/PROPH/DIAG IV INF INIT: CPT

## 2018-11-02 PROCEDURE — 36415 COLL VENOUS BLD VENIPUNCTURE: CPT

## 2018-11-02 PROCEDURE — 93971 EXTREMITY STUDY: CPT

## 2018-11-02 PROCEDURE — 99238 HOSP IP/OBS DSCHRG MGMT 30/<: CPT

## 2018-11-02 PROCEDURE — 85610 PROTHROMBIN TIME: CPT

## 2018-11-02 PROCEDURE — 83036 HEMOGLOBIN GLYCOSYLATED A1C: CPT

## 2018-11-02 PROCEDURE — 81001 URINALYSIS AUTO W/SCOPE: CPT

## 2018-11-02 PROCEDURE — 96366 THER/PROPH/DIAG IV INF ADDON: CPT

## 2018-11-02 PROCEDURE — 93005 ELECTROCARDIOGRAM TRACING: CPT

## 2018-11-02 PROCEDURE — 80048 BASIC METABOLIC PNL TOTAL CA: CPT

## 2018-11-02 PROCEDURE — 99285 EMERGENCY DEPT VISIT HI MDM: CPT | Mod: 25

## 2018-11-02 PROCEDURE — 83605 ASSAY OF LACTIC ACID: CPT

## 2018-11-02 PROCEDURE — 87086 URINE CULTURE/COLONY COUNT: CPT

## 2018-11-02 PROCEDURE — 85027 COMPLETE CBC AUTOMATED: CPT

## 2018-11-02 PROCEDURE — 96375 TX/PRO/DX INJ NEW DRUG ADDON: CPT

## 2018-11-02 PROCEDURE — 80053 COMPREHEN METABOLIC PANEL: CPT

## 2018-11-02 PROCEDURE — 85730 THROMBOPLASTIN TIME PARTIAL: CPT

## 2018-11-02 PROCEDURE — 87040 BLOOD CULTURE FOR BACTERIA: CPT

## 2018-11-02 PROCEDURE — 84100 ASSAY OF PHOSPHORUS: CPT

## 2018-11-02 RX ORDER — CEPHALEXIN 500 MG
1 CAPSULE ORAL
Qty: 28 | Refills: 0 | OUTPATIENT
Start: 2018-11-02 | End: 2018-11-08

## 2018-11-02 RX ORDER — ACETAMINOPHEN 500 MG
2 TABLET ORAL
Qty: 0 | Refills: 0 | COMMUNITY
Start: 2018-11-02

## 2018-11-02 RX ORDER — CEPHALEXIN 500 MG
500 CAPSULE ORAL
Qty: 0 | Refills: 0 | Status: DISCONTINUED | OUTPATIENT
Start: 2018-11-02 | End: 2018-11-02

## 2018-11-02 RX ORDER — SACCHAROMYCES BOULARDII 250 MG
1 POWDER IN PACKET (EA) ORAL
Qty: 14 | Refills: 0 | OUTPATIENT
Start: 2018-11-02 | End: 2018-11-08

## 2018-11-02 RX ADMIN — PIPERACILLIN AND TAZOBACTAM 25 GRAM(S): 4; .5 INJECTION, POWDER, LYOPHILIZED, FOR SOLUTION INTRAVENOUS at 05:33

## 2018-11-02 RX ADMIN — Medication 500 MILLIGRAM(S): at 12:39

## 2018-11-02 RX ADMIN — Medication 250 MILLIGRAM(S): at 05:45

## 2018-11-02 RX ADMIN — ENOXAPARIN SODIUM 40 MILLIGRAM(S): 100 INJECTION SUBCUTANEOUS at 12:39

## 2018-11-02 RX ADMIN — PANTOPRAZOLE SODIUM 40 MILLIGRAM(S): 20 TABLET, DELAYED RELEASE ORAL at 05:33

## 2018-11-02 NOTE — DISCHARGE NOTE ADULT - HOSPITAL COURSE
Pt is 54 y/o male with PMHx of GERD 9on Prilosec), presented to ED with LLE swelling, redness and pain,  cellulitis was on PO outpatient which showed no improvement (took 3 doses total). Admitted to medicine LLE cellulitis. Received one dose IV Vanco, and treated with IV Zosyn with significant improvement note.  Blood cx negative to date. WBC normalized. Patient has been afebrile throughout admission. Antibiotics changed to oral Keflex 500 mg QID to complete 10 day total. The patient is stable for discharge.     Vital Signs Last 24 Hrs  T(C): 36.4 (02 Nov 2018 08:39), Max: 36.7 (01 Nov 2018 15:40)  T(F): 97.6 (02 Nov 2018 08:39), Max: 98 (01 Nov 2018 15:40)  HR: 80 (02 Nov 2018 08:39) (77 - 83)  BP: 130/85 (02 Nov 2018 08:39) (125/83 - 134/78)  BP(mean): --  RR: 18 (02 Nov 2018 08:39) (18 - 18)  SpO2: 98% (02 Nov 2018 08:39) (98% - 99%)                            13.4   11.9  )-----------( 374      ( 02 Nov 2018 07:46 )             38.6     02 Nov 2018 08:04    139    |  101    |  17.0   ----------------------------<  111    4.1     |  23.0   |  0.94     Ca    9.4        02 Nov 2018 08:04  Phos  3.5       02 Nov 2018 08:04  Mg     2.4       02 Nov 2018 08:04        CAPILLARY BLOOD GLUCOSE            Hemoglobin A1C, Whole Blood: 5.3 % (11-01 @ 07:35)    Physical Exam:              General: Well developed. well nourished. not in distress  	HEENT: AT, NC. PERRL. intact EOM. no throat erythema or exudate.   	Neck: supple. no JVD.  	Chest: CTA bilaterally  	Heart: normal S1,S2. RRR. no heart murmur. no edema.   	Abdomen: soft. non-tender. non-distended. + BS.   	Rectal : deferred by pt.   	Ext: ROM of all ext. intact. LLE redness/ swelling, improving.  	Vascular : 2 + DP b/L.   	Neuro: AAO x3. no focal weakness. no speech disorder. CN II to XII intact. Pt is 52 y/o male with PMHx of GERD 9on Prilosec), presented to ED with LLE swelling, redness and pain, cellulitis was on PO outpatient which showed no improvement (took 3 doses total). Admitted to medicine LLE cellulitis. Received one dose IV Vanco, and treated with IV Zosyn with significant improvement noted. Blood cx negative to date. WBC trended down, remained afebrile throughout admission. Antibiotics changed to oral Keflex 500 mg QID to complete 10 day total. The patient is stable for discharge.     Vital Signs Last 24 Hrs  T(C): 36.4 (02 Nov 2018 08:39), Max: 36.7 (01 Nov 2018 15:40)  T(F): 97.6 (02 Nov 2018 08:39), Max: 98 (01 Nov 2018 15:40)  HR: 80 (02 Nov 2018 08:39) (77 - 83)  BP: 130/85 (02 Nov 2018 08:39) (125/83 - 134/78)  BP(mean): --  RR: 18 (02 Nov 2018 08:39) (18 - 18)  SpO2: 98% (02 Nov 2018 08:39)     General: Well developed. well nourished. not in distress  Chest: CTA bilaterally  Heart: normal S1,S2. RRR. no heart murmur. no edema.   Abdomen: soft. non-tender. non-distended. + BS.   Ext: ROM of all ext. intact. LLE redness/ swelling, improving.  Neuro: AAO x3. NO focal weakness. no speech disorder. CN II to XII intact.

## 2018-11-02 NOTE — DISCHARGE NOTE ADULT - PATIENT PORTAL LINK FT
You can access the [facility] Patient Portal, offered by [facility], by registering with the following website: [email or web address]

## 2018-11-02 NOTE — DISCHARGE NOTE ADULT - PLAN OF CARE
Resolution of infection Complete antibiotics as prescribed (ten day total)   follow up with PMD 5-7 days, sooner if needed  continue pro biotic while on antibiotics stable continue home treatment continue home treatment  F/u with PCP

## 2018-11-02 NOTE — DISCHARGE NOTE ADULT - CARE PLAN
Principal Discharge DX:	Cellulitis of left lower extremity  Goal:	Resolution of infection  Assessment and plan of treatment:	Complete antibiotics as prescribed (ten day total)   follow up with PMD 5-7 days, sooner if needed  continue pro biotic while on antibiotics  Secondary Diagnosis:	Psoriasis  Goal:	stable  Assessment and plan of treatment:	continue home treatment Principal Discharge DX:	Cellulitis of left lower extremity  Goal:	Resolution of infection  Assessment and plan of treatment:	Complete antibiotics as prescribed (ten day total)   follow up with PMD 5-7 days, sooner if needed  continue pro biotic while on antibiotics  Secondary Diagnosis:	Psoriasis  Goal:	stable  Assessment and plan of treatment:	continue home treatment  F/u with PCP

## 2018-11-02 NOTE — DISCHARGE NOTE ADULT - MEDICATION SUMMARY - MEDICATIONS TO TAKE
I will START or STAY ON the medications listed below when I get home from the hospital:    acetaminophen 325 mg oral tablet  -- 2 tab(s) by mouth every 6 hours, As needed, Temp greater or equal to 38C (100.4F), Moderate Pain (4 - 6)  -- Indication: For Pain    cephalexin 500 mg oral capsule  -- 1 cap(s) by mouth 4 times a day  -- Indication: For Cellulitis of left lower extremity    saccharomyces boulardii lyo 250 mg oral capsule  -- 1 cap(s) by mouth 2 times a day  -- Indication: For GI protection

## 2018-11-04 LAB
CULTURE RESULTS: SIGNIFICANT CHANGE UP
CULTURE RESULTS: SIGNIFICANT CHANGE UP
SPECIMEN SOURCE: SIGNIFICANT CHANGE UP
SPECIMEN SOURCE: SIGNIFICANT CHANGE UP

## 2020-07-31 NOTE — DISCHARGE NOTE ADULT - DISCHARGE DATE
Milan Pfeiffer  [id number]  7/31/2020    Procedures performed: I&D of right hand   Date of procedure:  06/20/20   Location of procedure: [facility]    SUBJECTIVE  HPI:Milan Pfeiffer is seen here today for his postop visit for the above procedure. He is 6 weeks post op.  The patient denies any fevers or chills. He denies pain.  Patient has completed his antibiotics and has been discharged from infectious disease. He reports numbness along the long finger and over the incision. He has resumed all his activities and returned to work with no issues.    OBJECTIVE  Vitals: There were no vitals taken for this visit.     Physical Exam :  This is a 59 year old male, awake, alert, and cooperative. He is well nourished, well developed, and in no apparent distress.  Pulmonary - No increased work of breathing.  Lymphatics - There is no evidence of generalized adenopathy.  Extremity: There is no evidence or clinical signs of infection.    Incision:  Incision is completely healed.  Good range of motion.  No scar tissue.  He is able to make a full composite fist.  Subjective decreased sensation dorsally.  Otherwise unremarkable.    IMAGING:  None today      ASSESSMENT/ PLAN  The patient is here for the above procedure. The patient is doing as expected. There are no signs of infection. The woundHas healed really well.  There is no evidence of residual infection.  The patient resume all activities as tolerated.  Patient will follow-up as needed.  As far as the decrease in station patient was  on that this may return over time period.  Otherwise decrease in station dorsally may not limit his activities of daily living.  All questions and concerns were addressed.  Patient will follow-up as needed    Electronically Signed by:    Luis Fernando Khan MD, 07/31/20      
02-Nov-2018

## 2021-11-09 NOTE — ED ADULT NURSE NOTE - NSIMPLEMENTINTERV_GEN_ALL_ED
show
Implemented All Universal Safety Interventions:  [place] to call system. Call bell, personal items and telephone within reach. Instruct patient to call for assistance. Room bathroom lighting operational. Non-slip footwear when patient is off stretcher. Physically safe environment: no spills, clutter or unnecessary equipment. Stretcher in lowest position, wheels locked, appropriate side rails in place.

## 2023-02-06 NOTE — DISCHARGE NOTE ADULT - PRINCIPAL DIAGNOSIS
Pr-14  4.2  [phone]     Patient Name:  Keegan Lawrence    Procedure: Port Insertion    Site Care: Dermabond (skin glue) has been applied to your incision. Do not scrub the area. Allow the Dermabond to flake off on its own. Activity/Diet  No heavy lifting or strenuous activity for 48 hours. Drink plenty of fluids, unless you have otherwise been told to restrict your fluid intake. Do not drink alcohol for 24 hours. Do not drive,  operate heavy machinery, make important decisions or sign legal documents today. Medications: Take acetaminophen if needed for pain. Do not exceed 4000mg of acetaminophen in a 24-hour period. , Do not take blood thinners, aspirin, or Plavix for 24 hours. , and Make no changes to your existing medications. Contact Interventional Radiology at [phone] if you have severe/unrelieved pain, fever, chills, dizziness/lightheadedness, or drainage/bleeding from your incision site.
Cellulitis of left lower extremity
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
## 2024-03-21 NOTE — DIETITIAN INITIAL EVALUATION ADULT. - TIME FRAME
March 21, 2024      [facility]  [address]  [facility] [address]  Phone: [phone]  Fax: [phone]       Patient: Becca Alonzo   YOB: 2020  Date of Visit: 03/21/2024    To Whom It May Concern:    Nika Alonzo  was at [facility] on 03/21/2024. The patient may return to work/school on 03/22/2024 with no restrictions. If you have any questions or concerns, or if I can be of further assistance, please do not hesitate to contact me.    Sincerely,    Sonia Montejo MA     
1 year

## 2025-02-27 NOTE — PHARMACOTHERAPY INTERVENTION NOTE - COMMENTS
Problem: Safety - Adult  Goal: Free from fall injury  2/27/2025 0225 by Karen Nick RN  Outcome: Progressing  2/26/2025 1814 by Kyra Hudson RN  Outcome: Progressing     Problem: Chronic Conditions and Co-morbidities  Goal: Patient's chronic conditions and co-morbidity symptoms are monitored and maintained or improved  2/27/2025 0225 by Karen Nick RN  Outcome: Progressing  2/26/2025 1814 by Kyra Hudson RN  Outcome: Progressing  Flowsheets (Taken 2/26/2025 0809)  Care Plan - Patient's Chronic Conditions and Co-Morbidity Symptoms are Monitored and Maintained or Improved: Monitor and assess patient's chronic conditions and comorbid symptoms for stability, deterioration, or improvement     Problem: Discharge Planning  Goal: Discharge to home or other facility with appropriate resources  2/27/2025 0225 by Karen Nick RN  Outcome: Progressing  2/26/2025 1814 by Kyra Hudson RN  Outcome: Progressing  Flowsheets (Taken 2/26/2025 0809)  Discharge to home or other facility with appropriate resources: Identify barriers to discharge with patient and caregiver     Problem: Pain  Goal: Verbalizes/displays adequate comfort level or baseline comfort level  2/27/2025 0225 by Karen Nick RN  Outcome: Progressing  2/26/2025 1814 by Kyra Hudson RN  Outcome: Progressing     Problem: Cardiovascular - Adult  Goal: Maintains optimal cardiac output and hemodynamic stability  2/27/2025 0225 by Karen Nick RN  Outcome: Progressing  2/26/2025 1814 by Kyra Hudson RN  Outcome: Progressing     Problem: Metabolic/Fluid and Electrolytes - Adult  Goal: Hemodynamic stability and optimal renal function maintained  2/27/2025 0225 by Karen Nick RN  Outcome: Progressing  2/26/2025 1814 by Kyra Hudson RN  Outcome: Progressing  Flowsheets (Taken 2/26/2025 0809)  Hemodynamic stability and optimal renal function maintained:   Monitor labs and assess for signs  per outpatient medication history, patient has not filled omeprazole since april 2018. Recommended to discontinue pantoprazole.    Patient on vancomycin 1g every 8 hours. Vanco level ordered for 11/1@3am (1 hour before giving 4am dose)